# Patient Record
Sex: FEMALE
[De-identification: names, ages, dates, MRNs, and addresses within clinical notes are randomized per-mention and may not be internally consistent; named-entity substitution may affect disease eponyms.]

---

## 2022-03-24 ENCOUNTER — NURSE TRIAGE (OUTPATIENT)
Dept: OTHER | Facility: CLINIC | Age: 72
End: 2022-03-24

## 2022-03-24 NOTE — TELEPHONE ENCOUNTER
[facility] Customer Service Caller states \" Pt called into [facility] asking for a pt advocate. She has chronic back pain and is suicidal.\"      Pt states \" I had my first ruptured disc when I was like 22 yo. I have had back pain, 4 rup disc total. Im use to back pain, sciatic nerve pain. I saw a doc in my 35s who told me to not have back surgery until I couldn't take it anymore. Back then they gave pain meds. No pain medication. I had sciatic nerve pain and had to work at the same time. I just delt with it. I also have migraines. I have a strong tollerance to pain. My PCP for 30 yrs prescribed tramadol for me due to my horse jumping on my foot. It sat in the cabinet for years. I dont see the lure of drugs. But now Im paying for it. I was always pleasant, easy going, I liked myself. Now I am irritable and hateful. If the back pain got too bad I took a tramadol and I managed. I delt with it, you deal with it. One year ago, I have a friend who is 81yo fell in his home. I started having to help him. My back started bothering me. Bending, driving, and Im thinking Im overdoing it. But it never stopped. It just got worse. But I thought this is how its been all my life. So then it turned into scatic nerve pain down my leg. It caused me to fall down the steps. I have a PCP now that Yumiko never liked but I tried to switch but with COVID they didn't have another one. To me I was in good shape for my age and I didn't have any unsolved issues. Im telling her from when it started and now Yumiko fallen, she is a doctor that doesn't care or listen. My hairdresser knows her and she switched for the same. I have arthritis in my knee and it is doable and livable but she said that was the pain. She wanted me to go to ortho doctor. I thought if I did what they wanted I would get help. The whole time I was telling him this is not where the pain is coming from. I was very active before last march, I walked and road my horse.  Little by little I had to stop doing things that made life enjoyable. I am now almost bed ridden, I get up but struggle to feed my animals. I know this isnt necessary. I asked for pain meds from PCP. All she would say opoid epidemic and if I heard it again it would push me more to suicide. I hate addicts because they've ended my life. I feel myself falling into deeper into depression. I get 2 hrs of sleep, pain wakes me, I might get sleep later in the day or maybe I wont. Meghann Youssef been dealing with this for a year. I dont know myslef anymore. On my own I looked up a pain management doctor. I thought this would help me. I started in last October and was still able to drive. He wanted to give me epidurals x2 and each lasted 3 weeks. Im still paying for them. The relief from the first one was like heaven. The second lasted about 3 weeks but maybe 5 weeks a little bit. My depression is like, nobody is listening to me. Yes Im suicidal. Meghann Youssef been suicidal other times in my life. I did everything they asked me to. I didn't go to the streets and get it. I have never done drugs or drink. I looked into medical marijuana. I am suicidal but you dont do it because of how it will effect those around you. I cant do anything, I cant drive anymore. I rescheduled appt with pain management doc. I told him I wanted to go ahead with the stimulator. I begged him for pain medication. Life is not worth living. He lowered his voice and held out his jacket and said its against the rules. If he did it he could lose his job. My psychiatrist put me on clorazepate to help me sleep. They never said they couldn't prescribe the pain meds due to the clorazepate. I went off of it so I could have pain medicine. I told him I went off of it and told him Im begging for pain med until the stimulator is in place. He gave me a print out of doctor and said this doctor will give you pain medicine and can do the stimulator. It seems under board. That's not how I want this to be.  My son said make the appt with that doctor. Of course that doctor is not in my plan. Since Monday I feel like justyna hit a brick wall. I have done everything I was supposed to for the past year. Life is not worth living. I absolutely do not know where to turn to get help. If I thought this was going to be my life there is no reason to live. Justyna even talked to my son about it. I drag myself around, I cant go anywhere. Now Im SOB all the time. I went form being active to no activity. I think my body is exhausted. I know my mind is exhausted. My friend said maybe I need a pt advocate. I dont know why if I can speak for myself would need one but this is my last ditch effort by reaching out to you guys today. If you are asking is it worse today than it has been for months, then no. Its worse than it has been in the past. I am reaching the point where I know there is an answer but Im not finding it. I stay around because of my son and because the part of my brain that says there IS an answer. Is it there, if I cant find an answer, then there is no reason to be around. Justyna explained that to my son. I try my best to get him to understand. Im not any worse, but am I thinking if I cant find the answer I have no fear of dying. Im a Ca[employer] and this is not living. I am becoming more and more hateful and that is not how I want to live. I don't recognize myself. Justyna seen psychiatrist my entire life and they make it so you see the light but nothing beats chronic pain. My 79yo friend has a much better outlook than I do. If my son were home Id say yes to ED. I try to be cooperative but he just left for vacation. I can go when he comes back. Another thing that wears on you, justyna fallen down the stairs twice. My PCP, the pain doctor. Everyone looks at you that you are just trying to score drugs. That has weighed on my mind. It is the way they look at you. I feel like justyna struggled all my life and not taken anything. \"     [facility] CS is going to have care person call the patient to help her with doctors and bills. Current Symptoms: chronic severe back pain, chronic migraines, suicidal due to pain, decreased sleep     Onset: 1 year ago; worsening    Associated Symptoms: reduced activity    Pain Severity: severe back pain     What has been tried: everything the docs have asked me to do. Recommended disposition: Go to ED Now- writer spoke to supervisor about at least offering [facility] but was told that is not a dispo we can offer for these symptoms since pt does not appear to be in any immediate danger to self. Well check not appropriate at this time. Pt does not appear to be suicidal at this time. Care advice provided, patient verbalizes understanding; denies any other questions or concerns; instructed to call back for any new or worsening symptoms. Patient was not agreeable to plan of ED. Writer offered SI hotline but she declined and stated she already has the number. Writer encouraged pt to reach out to us if she had questions, concerns or starts really feeling like she wants to harm herself. This triage is a result of a call to [address] [place] [address] of [email or web address]. Please do not respond to the triage nurse through this encounter. Any subsequent communication should be directly with the patient. Reason for Disposition   Depression symptoms (sadness, hopelessness, decreased energy) and unable to do any normal activities (e.g., self care, school, work; in [place] [address] to baseline).     Protocols used: SUICIDE CONCERNS-ADULT-[place]